# Patient Record
(demographics unavailable — no encounter records)

---

## 2024-10-07 NOTE — HISTORY OF PRESENT ILLNESS
[de-identified] : ***FIORELLA by ESTEPHANIA*** Age: 68 year F PMHx: HLD Hand Dominance: RHD Chief Complaint: Right thumb onset approx. July 2024 with NKI. Patient reports that her symptoms onset with no precipitating factors. Patient reports that she is having clicking and sticking on her right thumb along with pain and discomfort. Patient has been using ibuprofen and HEP with little to no relief. Denies numbness/tingling.  Trauma: NKI Outside Imaging/Treatment: none OTC Medications: ibuprofen  OT/PT: none Bracing: none Pain worse with: usage Pain better with: rest

## 2024-10-07 NOTE — ASSESSMENT
[FreeTextEntry1] : EXAM Right hand with no swelling or erythema. Able to make fist, oppose thumb to small finger with good thenar bulk. No intrinsic atrophy. Tender along distal palmar crease, most notable at thumb. No overt locking with catching palpable. Sensation intact throughout with <2sec cap refill.   ASSESSMENT & PLAN Right thumb trigger - Discussed with patient the pathoanatomy of trigger and options going forward. Risks/benefits discussed as well as natural progression that injection will provide some relief but symptoms may recur. Discussed that pain may worsen in coming days but will subside. Discussed that we can repeat an injection or that operative intervention may be indicated down the line. After discussion of risks/benefits, including glucose intolerance in the diabetic population, patient elected to proceed with CSI to the A1 pulley.  Procedure 1 - 0.5cc 1% lidocaine + 0.5cc 40mg/cc Kenalog administered to the right thumb A1 pulley following sterilization with Betadine. Patient tolerated this well.   F/u 3months

## 2024-10-10 NOTE — HISTORY OF PRESENT ILLNESS
[de-identified] : 09/26/24 Date of Injury/Onset: About 6 months, states she has fallen recently Due to the Knee Locking also has episodes with giving way and buckling , and when this causes knee to twist she states that the pain increases   /Mechanism of injury: NKI  Have you been treated for this in the past? PCP ordered x-ray, PT. Have you had surgery for this in the past? OTC Medicines:  ADVIL with Gastric Distress PCP Rx  Mobic  RX medicines: Mobic  Heat, Ice, Elevation: compression sleeve. Ice CSI or Visco supplementation Injections: none (Indicate which)  Physical Therapy/  Non-impact exercise program :  PT 1x a week. -[facility].  But has not seen relief and is sore after ..HEP  Pain: At Rest: 2/10  With Activity: 7/10  Affecting Sleep: Pain wakes her up  Difficulty with stairs: yes, up and down.  Painful  Difficulty getting in and out of car: YES . Sit to stand stiffness:  YES  Affects walking short/long distances? YES  Home/Work/Recreation effected? YES  This is not a Work-Related Injury being treated under Worker's Compensation.  This is not   an athletic injury occurring associated with an interscholastic or organized sports team.  Quality of symptoms: sharp  Improves with: ice, compression, rest Worse with: twisting, stairs      Previous Treatment/Imaging/Studies Since Last Visit: Reports Available for Review Today:

## 2024-10-10 NOTE — DISCUSSION/SUMMARY
[de-identified] : Patient has a medial meniscus tear of her right knee. Patient recently states she has fallen multiple times due to locking of the right knee. Patient reports her right knee gives out on her frequently and she feels unstable when doing ADLs.   Lengthy discussion regarding options was had with the patient. Nonsurgical options including but not limited to cortisone, Visco supplementation, Prescription anti-inflammatory medications (both steroidal and non-steroidal), activity modification, non-impact exercise, maintaining a healthy BMI, bracing, and icing were reviewed. Surgical options including but not limited to arthroscopy were discussed as was risks, benefits and alternatives of all the proposed treatments. Discussed also with the patient that they could also delay any immediate treatment options, and continue to observe and self care for the discussed problem. Discussed HEP as well as Rest, Ice and elevation. Patient had ample time to ask any questions about todays visit and the diagnosis, and all questions were answered. Patient understands the plan going forward.  Patient has been to PT with  Increased Pain  ,  OTC and RX NSAIDS  The patient was advised of the diagnosis. The natural history of the pathology was explained in full to the patient in layman's terms. Several different treatment options were discussed and explained in full to the patient including the risks and benefits of both surgical and non-surgical treatments. All questions and concerns were answered.  As the patient reports her ADLS are limited by the locking and giving out of her right knee, we discussed a right knee arthroscopy.  I spent time going in detail the problem and the associated risks/benefits of surgery. Went into detailed discussion of complications including but not limited to, nerve injury, non-union, repeat fracture, DVT /PE /pe postop, instability, transfusion, infection, NVA injury, stiffness, leg length discrepancy, inability to ambulate & death. Discussed implant and model shown to patient. Patient had ample time to ask any questions, and at this time answered all patient questions, should anymore arise they were instructed to follow up. Patient expressed understanding of the proposed procedure and postop directions.   The plan at this time is to move forward with a right knee arthroscopy.

## 2024-10-10 NOTE — HISTORY OF PRESENT ILLNESS
[de-identified] : 09/26/24 Date of Injury/Onset: About 6 months, states she has fallen recently Due to the Knee Locking also has episodes with giving way and buckling , and when this causes knee to twist she states that the pain increases   /Mechanism of injury: NKI  Have you been treated for this in the past? PCP ordered x-ray, PT. Have you had surgery for this in the past? OTC Medicines:  ADVIL with Gastric Distress PCP Rx  Mobic  RX medicines: Mobic  Heat, Ice, Elevation: compression sleeve. Ice CSI or Visco supplementation Injections: none (Indicate which)  Physical Therapy/  Non-impact exercise program :  PT 1x a week. -[facility].  But has not seen relief and is sore after ..HEP  Pain: At Rest: 2/10  With Activity: 7/10  Affecting Sleep: Pain wakes her up  Difficulty with stairs: yes, up and down.  Painful  Difficulty getting in and out of car: YES . Sit to stand stiffness:  YES  Affects walking short/long distances? YES  Home/Work/Recreation effected? YES  This is not a Work-Related Injury being treated under Worker's Compensation.  This is not   an athletic injury occurring associated with an interscholastic or organized sports team.  Quality of symptoms: sharp  Improves with: ice, compression, rest Worse with: twisting, stairs      Previous Treatment/Imaging/Studies Since Last Visit: Reports Available for Review Today:

## 2024-10-10 NOTE — DISCUSSION/SUMMARY
[de-identified] : Patient has a medial meniscus tear of her right knee. Patient recently states she has fallen multiple times due to locking of the right knee. Patient reports her right knee gives out on her frequently and she feels unstable when doing ADLs.   Lengthy discussion regarding options was had with the patient. Nonsurgical options including but not limited to cortisone, Visco supplementation, Prescription anti-inflammatory medications (both steroidal and non-steroidal), activity modification, non-impact exercise, maintaining a healthy BMI, bracing, and icing were reviewed. Surgical options including but not limited to arthroscopy were discussed as was risks, benefits and alternatives of all the proposed treatments. Discussed also with the patient that they could also delay any immediate treatment options, and continue to observe and self care for the discussed problem. Discussed HEP as well as Rest, Ice and elevation. Patient had ample time to ask any questions about todays visit and the diagnosis, and all questions were answered. Patient understands the plan going forward.  Patient has been to PT with  Increased Pain  ,  OTC and RX NSAIDS  The patient was advised of the diagnosis. The natural history of the pathology was explained in full to the patient in layman's terms. Several different treatment options were discussed and explained in full to the patient including the risks and benefits of both surgical and non-surgical treatments. All questions and concerns were answered.  As the patient reports her ADLS are limited by the locking and giving out of her right knee, we discussed a right knee arthroscopy.  I spent time going in detail the problem and the associated risks/benefits of surgery. Went into detailed discussion of complications including but not limited to, nerve injury, non-union, repeat fracture, DVT /PE /pe postop, instability, transfusion, infection, NVA injury, stiffness, leg length discrepancy, inability to ambulate & death. Discussed implant and model shown to patient. Patient had ample time to ask any questions, and at this time answered all patient questions, should anymore arise they were instructed to follow up. Patient expressed understanding of the proposed procedure and postop directions.   The plan at this time is to move forward with a right knee arthroscopy.

## 2024-10-10 NOTE — PHYSICAL EXAM
[5___] : hamstring 5[unfilled]/5 [Positive] : positive Rafaela [Right] : right knee [All Views] : anteroposterior, lateral, skyline, and anteroposterior standing [Outside films reviewed] : Outside films reviewed [] : no extensor lag [de-identified] : Flexed Knee Gait  [FreeTextEntry9] : [facility].PREVIOUS XRAY SHOWS medial joint space narrowing.  Joint space maintained, small Osteophyte formation. No fractures seen [TWNoteComboBox7] : flexion 100 degrees [de-identified] : extension 10 degrees

## 2024-10-10 NOTE — PHYSICAL EXAM
[5___] : hamstring 5[unfilled]/5 [Positive] : positive Rafaela [Right] : right knee [All Views] : anteroposterior, lateral, skyline, and anteroposterior standing [Outside films reviewed] : Outside films reviewed [] : no extensor lag [de-identified] : Flexed Knee Gait  [FreeTextEntry9] : [facility].PREVIOUS XRAY SHOWS medial joint space narrowing.  Joint space maintained, small Osteophyte formation. No fractures seen [TWNoteComboBox7] : flexion 100 degrees [de-identified] : extension 10 degrees

## 2024-10-25 NOTE — DISCUSSION/SUMMARY
[de-identified] : Discussed importance of non-impact exercise and muscle stretching before and after exercise. Explained the importance of ice and rest.  Pt is doing well and is to continue with treatment plan. Pt was shown exercises and stretches that can help increase  ROM and strength.  Patient will begin OPPT at this time, given Rx for this.  F/u in 4 weeks

## 2024-10-25 NOTE — HISTORY OF PRESENT ILLNESS
[de-identified] : Patient is s/p RT KNEE ARTHROSCOPY MEDIAL MENISCECTOMY 10/17/24. Patient denies fevers chills SOB. Patient ambulating without support, reports minimal pain, with some soreness. Patient denies any use of medication for pain at this time.

## 2024-10-25 NOTE — PHYSICAL EXAM
[Right] : right knee [] : non-antalgic [FreeTextEntry8] : Mild Medial pain with flexion  [TWNoteComboBox7] : flexion 120 degrees [de-identified] : extension 0 degrees

## 2024-12-03 NOTE — PHYSICAL EXAM
[Right] : right knee [5___] : hamstring 5[unfilled]/5 [] : patient ambulates without assistive device [FreeTextEntry8] : Mild Medial pain with flexion  [TWNoteComboBox7] : flexion 125 degrees [de-identified] : extension 0 degrees

## 2024-12-03 NOTE — DISCUSSION/SUMMARY
[de-identified] : Patient is progressing well at this time. Upon inspection of the incision, the area was clean, dry and there were no signs of infection.   Patient has been involved in OPPT at this time,   Discussion was had with the patient regarding the importance of working on ROM at this time both with PT and at home using HEP shown today. Discussed the importance of gaining and maintaining good ROM and its involvement in daily life. Also discussed with patient the importance of FIDELIA stockings along with adherence to the post operative protocol including but not limited to anticoag medication.  Discussed importance of non-impact exercise and muscle stretching before and after exercise.  Stretching exercises shown, Explained the importance of Range of Motion before strength. Explained the importance of ice and rest.      Patient will f/u PRN

## 2024-12-03 NOTE — HISTORY OF PRESENT ILLNESS
[de-identified] : Patient is s/p RT KNEE ARTHROSCOPY MEDIAL MENISCECTOMY 10/17/24. Patient denies fevers chills SOB. Patient ambulating without support, reports minimal pain, with some soreness. Patient denies any use of medication for pain at this time.

## 2025-01-08 NOTE — ASSESSMENT
[FreeTextEntry1] : Patient reports overall improvement in her bladder symptoms.  Ideally she would have like to increase the dose of Solifenacin but she is bothered by mild dryness.

## 2025-01-08 NOTE — DISCUSSION/SUMMARY
[FreeTextEntry1] : Following management plan was outlined after having a detailed discussion with the patient: 1.  Offered her a trial of alternative medication such as Gemtesa 75 mg p.o. daily or trospium 60 mg p.o. daily extended release.  Prescription sent to her pharmacy.  She will check with her pharmacy if any of these are  covered on her health plan. 2.  If she has to continue Solifenacin, discussed use of sugar-free gum or Hard candy to stimulate more glands 3.  She denies bother symptoms of stress urinary incontinence.  Would like to pursue expectant management at this point. 4.  Follow-up in 3 months

## 2025-01-08 NOTE — PROCEDURE
[Residual Volume ___ cc] : residual volume [unfilled] cc [No Complications] : no complications [Tolerated Well] : the patient tolerated the procedure well [Post procedure instructions and information given] : Post procedure instructions and information were given and reviewed with patient. [0] : 0

## 2025-01-08 NOTE — HISTORY OF PRESENT ILLNESS
[FreeTextEntry1] : Patient is a 68 years old multipara with worsening urinary urgency, urgency incontinence and nocturia in the past 1 year, ongoing stress urinary incontinence as well as history of fecal incontinence without sensory awareness in the past 2 months. She presented for urodynamic testing on 8/26/2024 for further evaluation of incontinence. Urodynamic test findings include a somewhat prolonged and interrupted uroflow with voided volume of 190 cc, postvoid residual of 100 cc, and max flow rate of 25; her complex cystogram showed increased sensation, reduced cystometric preferably, presence of detrusor overactivity with leakage starting at full volume of 130 cc, filling rate was reduced to 20 cc/s, and presumed fill volume of 200 cc, she had urine leakage with coughing; she voided 474 cc for the pressure voiding study with max flow rate of 26, average flow rate of 15, detrusor pressure at max flow of 68 cm of water, normal normal continuous configuration of uroflow and detrusor contraction as the mechanism of void. Patient states that her main concern is urinary urgency and inability to make it to the bathroom. She was previously placed prescribed Myrbetriq by her OB/GYN however the medication was too expensive on her plan.  She was then started on Solifenacin 5 mg.  She does state the Solifenacin helps with urinary urgency and leakage on average 20%.  She does not want to increase the dose of the medicine because she has been experiencing mouth dryness.  She ran out of the prescription few weeks ago and that is when she realized that Solifenacin was helping her significantly

## 2025-02-13 NOTE — PROCEDURE
[Large Joint Injection] : Large joint injection [Right] : of the right [Knee] : knee [Pain] : pain [Inflammation] : inflammation [X-ray evidence of Osteoarthritis on this or prior visit] : x-ray evidence of Osteoarthritis on this or prior visit [Repeat series performed] : repeat series performed [Betadine] : betadine [Ethyl Chloride sprayed topically] : ethyl chloride sprayed topically [Sterile technique used] : sterile technique used [Euflexxa(20mg)] : 20mg of Euflexxa [#1] : series #1 [] : Patient tolerated procedure well [Call if redness, pain or fever occur] : call if redness, pain or fever occur [Apply ice for 15min out of every hour for the next 12-24 hours as tolerated] : apply ice for 15 minutes out of every hour for the next 12-24 hours as tolerated [Patient had decreased mobility in the joint] : patient had decreased mobility in the joint [Risks, benefits, alternatives discussed / Verbal consent obtained] : the risks benefits, and alternatives have been discussed, and verbal consent was obtained

## 2025-02-13 NOTE — PHYSICAL EXAM
[Right] : right knee [5___] : hamstring 5[unfilled]/5 [Negative] : negative Ellie's [] : non-antalgic [FreeTextEntry8] : Mild Medial pain with flexion  [TWNoteComboBox7] : flexion 120 degrees [de-identified] : extension 0 degrees

## 2025-02-13 NOTE — DISCUSSION/SUMMARY
[de-identified] : Diagnosis Knee Osteoarthritis  DAI PARKER 68 year  F was seen and evaluated in the office today. Following evaluation, and history of the patient's condition at length, the pathology was explained in full to the patient in layman's terms. Patient has Knee Osteoarthritis. Osteoarthritis is a degenerative joint disease where the cartilage in the knee gradually wears away, leading to pain, stiffness, and reduced mobility. Initially, symptoms may be mild, however this is a progressive condition, and the pain is expected to become more severe and persistent. Advanced stages of knee OA can result in significant disability, making daily activities challenging. I discussed with the patient that since this condition is expected to continue to worsen, they will eventually need a total knee replacement in their life time.  In the interim, discussed with patient several different treatment options regarding managing the gradual loss of function associated with knee OA, along with specific risks and benefits. Nonsurgical options including but not limited to Corticosteroid Injection, Visco Supplementation, Meloxicam 15mg, Medrol Dose Pack, along with activity modification such as non-impact exercise and organized physical therapy. The risk of morbidity associated with proposed treatments were discussed.   Furthermore, discussed with DAI that they could also delay any immediate treatment options and continue to observe and self-care for the discussed problem. Discussed Home Exercise Programs as well as Rest, Ice and elevation. Patient had ample time to ask any questions about todays visit and the diagnosis, and all questions were answered. Patient verbally expressed they understand the discussion today and the plan going forward. -- At this time, indicated patient for Meloxicam 15mg due to pain and inflammation of the knee.   -Patient reports that they do not wish to take medication at this time, patient refused prescription.  --  At this time, patient is indicated for physical therapy due to their reduced ROM and weakness. Discussed with patient the benefits of physical therapy due to their current pain and limited function.  -At this time the patient declined physical therapy, reports that they wish to proceed with home exercise program. Discussed proper exercise with patient at this time, demonstrated in office. -- At this time, due to increased pain and inflammation along with radiograph evidence of medial joint space narrowing and Degenerative changes on intraoperative pictures from 10/2024 RT knee arthroscopy, patient is indicated for RT KNEE EUFLEXXA SERIES INJECTION.   Patient will f/u 1 week for next injection

## 2025-02-13 NOTE — HISTORY OF PRESENT ILLNESS
[de-identified] : 2/13/2025: Patient presents today for follow up on the RT KNEE, Patient is s/p RT KNEE ARTHROSCOPY MEDIAL MENISCECTOMY 10/17/24. Patient reports that she had d/c PT 2 months ago, and taking Ibuprofen OTC. Patient reports she still has pain in the knee, mostly medial aspect. Patient reports she has increased pain with stairs, squatting and pivoting, and getting up from seated position. Patient also reports she has been experiencing clicking in the knee. Patient reports she notes she has been favoring her left leg due to the right knee pain.   12/3/24: Patient is s/p RT KNEE ARTHROSCOPY MEDIAL MENISCECTOMY 10/17/24. Patient denies fevers chills SOB. Patient ambulating without support, reports minimal pain, with some soreness. Patient denies any use of medication for pain at this time.

## 2025-02-21 NOTE — HISTORY OF PRESENT ILLNESS
[de-identified] : 2/21/25: Follow up right knee. Here today for Euflexxa #2. Patient states she felt increased pain for a few days following the first injection. Patient is taking Ibuprofen PRN with no relief.  2/13/2025: Patient presents today for follow up on the RT KNEE, Patient is s/p RT KNEE ARTHROSCOPY MEDIAL MENISCECTOMY 10/17/24. Patient reports that she had d/c PT 2 months ago, and taking Ibuprofen OTC. Patient reports she still has pain in the knee, mostly medial aspect. Patient reports she has increased pain with stairs, squatting and pivoting, and getting up from seated position. Patient also reports she has been experiencing clicking in the knee. Patient reports she notes she has been favoring her left leg due to the right knee pain.   12/3/24: Patient is s/p RT KNEE ARTHROSCOPY MEDIAL MENISCECTOMY 10/17/24. Patient denies fevers chills SOB. Patient ambulating without support, reports minimal pain, with some soreness. Patient denies any use of medication for pain at this time.

## 2025-02-21 NOTE — PHYSICAL EXAM
[Right] : right knee [5___] : hamstring 5[unfilled]/5 [Negative] : negative Ellie's [] : non-antalgic [FreeTextEntry8] : Mild Medial pain with flexion  [TWNoteComboBox7] : flexion 120 degrees [de-identified] : extension 0 degrees

## 2025-02-21 NOTE — DISCUSSION/SUMMARY
[de-identified] : Diagnosis Knee Osteoarthritis  DAI PARKER 68 year  F was seen and evaluated in the office today. Following evaluation, and history of the patient's condition at length, the pathology was explained in full to the patient in layman's terms. Patient has Knee Osteoarthritis. Osteoarthritis is a degenerative joint disease where the cartilage in the knee gradually wears away, leading to pain, stiffness, and reduced mobility. Initially, symptoms may be mild, however this is a progressive condition, and the pain is expected to become more severe and persistent. Advanced stages of knee OA can result in significant disability, making daily activities challenging. I discussed with the patient that since this condition is expected to continue to worsen, they will eventually need a total knee replacement in their life time.  In the interim, discussed with patient several different treatment options regarding managing the gradual loss of function associated with knee OA, along with specific risks and benefits. Nonsurgical options including but not limited to Corticosteroid Injection, Visco Supplementation, Meloxicam 15mg, Medrol Dose Pack, along with activity modification such as non-impact exercise and organized physical therapy. The risk of morbidity associated with proposed treatments were discussed.    -- At this time, indicated patient for Meloxicam 15mg due to pain and inflammation of the knee.   Discussed Meloxicam  Rx Provided   --  At this time, patient is indicated for physical therapy due to their reduced ROM and weakness. Discussed with patient the benefits of physical therapy due to their current pain and limited function.  -At this time the patient declined physical therapy, reports that they wish to proceed with home exercise program. Discussed proper exercise with patient at this time, demonstrated in office. -- At this time, due to increased pain and inflammation along with radiograph evidence of medial joint space narrowing and Degenerative changes on intraoperative pictures from 10/2024 RT knee arthroscopy, patient is indicated for RT KNEE EUFLEXXA SERIES INJECTION.   Patient will f/u 1 week for next injection

## 2025-02-28 NOTE — DISCUSSION/SUMMARY
[de-identified] : Diagnosis Knee Osteoarthritis  DAI PARKER 68 year  F was seen and evaluated in the office today. Following evaluation, and history of the patient's condition at length, the pathology was explained in full to the patient in layman's terms. Patient has Knee Osteoarthritis. Osteoarthritis is a degenerative joint disease where the cartilage in the knee gradually wears away, leading to pain, stiffness, and reduced mobility. Initially, symptoms may be mild, however this is a progressive condition, and the pain is expected to become more severe and persistent. Advanced stages of knee OA can result in significant disability, making daily activities challenging. I discussed with the patient that since this condition is expected to continue to worsen, they will eventually need a total knee replacement in their life time.  In the interim, discussed with patient several different treatment options regarding managing the gradual loss of function associated with knee OA, along with specific risks and benefits. Nonsurgical options including but not limited to Corticosteroid Injection, Visco Supplementation, Meloxicam 15mg, Medrol Dose Pack, along with activity modification such as non-impact exercise and organized physical therapy. The risk of morbidity associated with proposed treatments were discussed.    -- Proceeded with final euflexxa injection RT KNEE

## 2025-02-28 NOTE — PHYSICAL EXAM
[Right] : right knee [5___] : hamstring 5[unfilled]/5 [Negative] : negative Ellie's [] : non-antalgic [FreeTextEntry8] : Mild Medial pain with flexion  [TWNoteComboBox7] : flexion 120 degrees [de-identified] : extension 0 degrees

## 2025-02-28 NOTE — HISTORY OF PRESENT ILLNESS
[de-identified] : 2/28/25: Follow up right knee. Here today for Euflexxa #3.   2/21/25: Follow up right knee. Here today for Euflexxa #2. Patient states she felt increased pain for a few days following the first injection. Patient is taking Ibuprofen PRN with no relief.  2/13/2025: Patient presents today for follow up on the RT KNEE, Patient is s/p RT KNEE ARTHROSCOPY MEDIAL MENISCECTOMY 10/17/24. Patient reports that she had d/c PT 2 months ago, and taking Ibuprofen OTC. Patient reports she still has pain in the knee, mostly medial aspect. Patient reports she has increased pain with stairs, squatting and pivoting, and getting up from seated position. Patient also reports she has been experiencing clicking in the knee. Patient reports she notes she has been favoring her left leg due to the right knee pain.   12/3/24: Patient is s/p RT KNEE ARTHROSCOPY MEDIAL MENISCECTOMY 10/17/24. Patient denies fevers chills SOB. Patient ambulating without support, reports minimal pain, with some soreness. Patient denies any use of medication for pain at this time.

## 2025-03-26 NOTE — HISTORY OF PRESENT ILLNESS
[FreeTextEntry1] : 69yo with GABBY, FI and vaginal atrophy. Prescribed Gemtesa 75 mg p.o. daily or trospium 60 mg p.o. at last visit. Took solifenacin and trospium for a while but had dry eyes and dry mouth. Gemtesa and Myrbetriq were not affordable.   PMH: Asthma, chronic back pain, bronchitis, depression, hyperlipidemia, ulcer PSH: Lumbar laminectomy L4-L5 in 2010, abdominoplasty 2017, breast reduction in 2019 Social history: Patient is a , she is a retired nurse.  Day time voids: 15 Nighttime voids: 2 GRISELDA: daily  UUI: daily Urinary urgency: yes Bladder irritants: following bladder diet  Prior treatment: Myrbetriq & Gemtesa (too expensive), Solifenacin 5 mg, following bladder diet, PFPT Labs and chart reviewed: UDS: presence of detrusor overactivity with leakage starting at full volume of 130 cc, filling rate was reduced to 20 cc/s, and presumed fill volume of 200 cc, she had urine leakage with coughing

## 2025-04-23 NOTE — DISCUSSION/SUMMARY
[FreeTextEntry1] : 67yo with GABBY, FI and vaginal atrophy. Patient is bothered by GRISELDA and would like definitive management with a sling.  -UDS was positive for GRISELDA. We discussed the etiology and management options. She would like to proceed with: MUS with cystoscopy.  -We discussed that MUS is a 85% successful for treatment of GRISELDA. We discussed risks of procedure including: risk of bleeding requiring blood transfusion, infection, damage to surrounding organs, recurrence of GRISELDA, mesh exposure, acute/chronic urinary retention, UTI, urinary urgency/UUI. All risks and benefits of the options were discussed, and all questions were answered.  -Aware that MUS is not meant for treatment of OAB and if those symptoms persist or worsen, we will address after surgery.  -Will proceed with MUS at [facility], declined study participation. -Aware of the need for PST/clearance.  -Informed consent obtained and forms signed.   -Will return for repeat exam prior to surgery

## 2025-04-23 NOTE — HISTORY OF PRESENT ILLNESS
[FreeTextEntry1] : 67yo with GABBY, FI and vaginal atrophy. At last visit, we discussed OAB/GRISELDA options and patient was considering procedures. Patient would like to proceed with MUS.   Prior treatments: solifenacin and trospium (dry eyes and dry mouth). Gemtesa and Myrbetriq were not affordable. UDS: presence of detrusor overactivity with leakage starting at full volume of 130 cc, filling rate was reduced to 20 cc/s, and presumed fill volume of 200 cc, she had urine leakage with coughing. [facility].

## 2025-04-23 NOTE — HISTORY OF PRESENT ILLNESS
[FreeTextEntry1] : 69yo with GABBY, FI and vaginal atrophy. At last visit, we discussed OAB/GRISELDA options and patient was considering procedures. Patient would like to proceed with MUS.   Prior treatments: solifenacin and trospium (dry eyes and dry mouth). Gemtesa and Myrbetriq were not affordable. UDS: presence of detrusor overactivity with leakage starting at full volume of 130 cc, filling rate was reduced to 20 cc/s, and presumed fill volume of 200 cc, she had urine leakage with coughing. nursing home [facility].

## 2025-04-23 NOTE — DISCUSSION/SUMMARY
[FreeTextEntry1] : 69yo with GABBY, FI and vaginal atrophy. Patient is bothered by GRISELDA and would like definitive management with a sling.  -UDS was positive for GRISELDA. We discussed the etiology and management options. She would like to proceed with: MUS with cystoscopy.  -We discussed that MUS is a 85% successful for treatment of GRISELDA. We discussed risks of procedure including: risk of bleeding requiring blood transfusion, infection, damage to surrounding organs, recurrence of GRISELDA, mesh exposure, acute/chronic urinary retention, UTI, urinary urgency/UUI. All risks and benefits of the options were discussed, and all questions were answered.  -Aware that MUS is not meant for treatment of OAB and if those symptoms persist or worsen, we will address after surgery.  -Will proceed with MUS at [facility], declined study participation. -Aware of the need for PST/clearance.  -Informed consent obtained and forms signed.   -Will return for repeat exam prior to surgery

## 2025-04-25 NOTE — DISCUSSION/SUMMARY
[FreeTextEntry1] : 67yo with GABBY, FI and vaginal atrophy.  1. UUI -Patient states that she does not want to be on meds. Anti-cholinergics led to side effects and beta-agonists were not affordable. -She had UDS showing GRISELDA and DO -The etiology of OAB was discussed. Management options including observation, behavioral modifications (dietary changes, monitoring fluid intake, bladder training, timed voids), PFPT, medications, PTNS, SNS, and bladder Botox were all reviewed.  -Bladder botox risks such as UTIs, hematuria, incomplete bladder emptying or retention requiring CIC or catheterization reviewed. IUGA leaflets provided. -Patient will call back with decision.  2. GRISELDA -The patient has symptoms consistent with stress urinary incontinence. The etiology of GRISELDA was discussed. Management options including observation, behavioral modifications, pessary, Impressa insert, periurethral bulking via cystoscopy, and surgery with midurethral sling were reviewed. -Patient will call back with decision.
[FreeTextEntry1] : 69yo with GABBY, FI and vaginal atrophy.  1. UUI -Patient states that she does not want to be on meds. Anti-cholinergics led to side effects and beta-agonists were not affordable. -She had UDS showing GRISELDA and DO -The etiology of OAB was discussed. Management options including observation, behavioral modifications (dietary changes, monitoring fluid intake, bladder training, timed voids), PFPT, medications, PTNS, SNS, and bladder Botox were all reviewed.  -Bladder botox risks such as UTIs, hematuria, incomplete bladder emptying or retention requiring CIC or catheterization reviewed. IUGA leaflets provided. -Patient will call back with decision.  2. GRISELDA -The patient has symptoms consistent with stress urinary incontinence. The etiology of GRISELDA was discussed. Management options including observation, behavioral modifications, pessary, Impressa insert, periurethral bulking via cystoscopy, and surgery with midurethral sling were reviewed. -Patient will call back with decision.
minimal nasal flaring

## 2025-05-27 NOTE — HISTORY OF PRESENT ILLNESS
[FreeTextEntry1] : 67yo with GABBY, FI and vaginal atrophy. Patient would like to proceed with MUS. Presents today for pre-op exam.

## 2025-05-27 NOTE — DISCUSSION/SUMMARY
[FreeTextEntry1] : 69yo with GABBY, FI and vaginal atrophy. Patient is bothered by GRISELDA and would like definitive management with a sling.

## 2025-05-27 NOTE — PHYSICAL EXAM
[Chaperoned Physical Exam] : A chaperone was present in the examining room during all aspects of the physical examination. [MA] : MA [No Acute Distress] : in no acute distress [Well developed] : well developed [Oriented x3] : oriented to person, place, and time [Normal Memory] : ~T memory was ~L unimpaired [Warm and Dry] : was warm and dry to touch [Normal Gait] : gait was normal [Labia Majora] : were normal [Labia Minora] : were normal [Normal Appearance] : general appearance was normal [Normal] : no abnormalities [Exam Deferred] : was deferred [FreeTextEntry2] : Erika [Cough] : no cough [FreeTextEntry3] : negative CST [de-identified] : no POP